# Patient Record
Sex: FEMALE | Race: WHITE | Employment: STUDENT | URBAN - METROPOLITAN AREA
[De-identification: names, ages, dates, MRNs, and addresses within clinical notes are randomized per-mention and may not be internally consistent; named-entity substitution may affect disease eponyms.]

---

## 2017-01-05 ENCOUNTER — HOSPITAL ENCOUNTER (OUTPATIENT)
Age: 22
Discharge: HOME | End: 2017-01-05
Attending: FAMILY MEDICINE
Payer: COMMERCIAL

## 2017-01-05 VITALS
TEMPERATURE: 99 F | WEIGHT: 135 LBS | SYSTOLIC BLOOD PRESSURE: 118 MMHG | DIASTOLIC BLOOD PRESSURE: 69 MMHG | HEART RATE: 71 BPM | RESPIRATION RATE: 14 BRPM | OXYGEN SATURATION: 99 %

## 2017-01-05 DIAGNOSIS — S61.011A LACERATION OF THUMB, RIGHT, INITIAL ENCOUNTER: Primary | ICD-10-CM

## 2017-01-05 PROCEDURE — 99203 OFFICE O/P NEW LOW 30 MIN: CPT

## 2017-01-05 NOTE — ED PROVIDER NOTES
Patient Seen in: [facility]    History   Patient presents with:  Laceration Abrasion (integumentary)    Stated Complaint: finger lac    HPI    Patient is a 59-year-old female.   Coming today with a laceration to the distal p Current:/69 mmHg  Pulse 71  Temp(Src) 98.5 °F (36.9 °C) (Oral)  Resp 14  Wt 61.236 kg  SpO2 99%  LMP 11/05/2016 (Approximate)        Physical Exam   Constitutional: She is oriented to person, place, and time.  She appears well-developed and well

## (undated) NOTE — ED AVS SNAPSHOT
[facility]AL[facility]  Estephania Sampson    [address] [place] [address]    Phone:  [phone]    Fax:  [phone]           Selin Anguiano   MRN: [id number]    Department:  THE [facility]   Date of Visit:  1/ If you have any problems with your follow-up, please call our  at [phone]. Si usted tiene algun problema con mcneill sequimiento, por favor llame a nuestro adminstrador de casos al [phone].     Expect to receive an electronic reques Danielle Chung [address] [facility] ([address]) [address] [facility] [address] ([address] [facility] [address]) [address]  ([address] [employer] [facility]) [address] [facility] Enter your [employer] Activation Code exactly as it appears below along with your Zip Code and Date of Birth to complete the sign-up process. If you do not sign up before the expiration date, you must request a new code.     Your unique [employer] Access Code: [id number]